# Patient Record
Sex: FEMALE | Race: OTHER | HISPANIC OR LATINO | URBAN - METROPOLITAN AREA
[De-identification: names, ages, dates, MRNs, and addresses within clinical notes are randomized per-mention and may not be internally consistent; named-entity substitution may affect disease eponyms.]

---

## 2021-01-20 ENCOUNTER — EMERGENCY (EMERGENCY)
Facility: HOSPITAL | Age: 21
LOS: 1 days | Discharge: HOME | End: 2021-01-20
Attending: STUDENT IN AN ORGANIZED HEALTH CARE EDUCATION/TRAINING PROGRAM
Payer: SELF-PAY

## 2021-01-20 VITALS
HEART RATE: 99 BPM | RESPIRATION RATE: 18 BRPM | TEMPERATURE: 99 F | DIASTOLIC BLOOD PRESSURE: 68 MMHG | OXYGEN SATURATION: 98 % | WEIGHT: 138.01 LBS | SYSTOLIC BLOOD PRESSURE: 129 MMHG

## 2021-01-20 PROCEDURE — 70450 CT HEAD/BRAIN W/O DYE: CPT | Mod: 26

## 2021-01-20 PROCEDURE — 99284 EMERGENCY DEPT VISIT MOD MDM: CPT

## 2021-01-20 PROCEDURE — 70450 CT HEAD/BRAIN W/O DYE: CPT

## 2021-01-20 PROCEDURE — 99284 EMERGENCY DEPT VISIT MOD MDM: CPT | Mod: 25

## 2021-01-20 RX ORDER — IBUPROFEN 200 MG
600 TABLET ORAL ONCE
Refills: 0 | Status: COMPLETED | OUTPATIENT
Start: 2021-01-20 | End: 2021-01-20

## 2021-01-20 RX ORDER — METHOCARBAMOL 500 MG/1
750 TABLET, FILM COATED ORAL ONCE
Refills: 0 | Status: COMPLETED | OUTPATIENT
Start: 2021-01-20 | End: 2021-01-20

## 2021-01-20 RX ADMIN — Medication 600 MILLIGRAM(S): at 12:30

## 2021-01-20 RX ADMIN — METHOCARBAMOL 750 MILLIGRAM(S): 500 TABLET, FILM COATED ORAL at 12:30
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
## 2021-01-20 NOTE — ED ADULT NURSE NOTE - MODE OF DISCHARGE
Current Discharge Medication List  
  
ASK your doctor about these medications Dose & Instructions Dispensing Information Comments Morning Noon Evening Bedtime  
 baclofen 10 mg tablet Commonly known as:  LIORESAL Your next dose is: Today, Tomorrow Other:  ____________ Dose:  10 mg Take 10 mg by mouth as needed. Refills:  0 GENGRAF 25 mg capsule Generic drug:  cycloSPORINE modified Your next dose is: Today, Tomorrow Other:  ____________ Dose:  2.5 mg/kg/day Take 2.5 mg/kg/day by mouth every morning. Indications: Takes in the AM  
 Refills:  0  
     
   
   
   
  
 lactulose 10 gram/15 mL solution Commonly known as:  Eual Alice Your next dose is: Today, Tomorrow Other:  ____________ Dose:  30 g Take 45 mL by mouth three (3) times daily. Quantity:  480 mL Refills:  0  
     
   
   
   
  
 LANTUS SOLOSTAR 100 unit/mL (3 mL) pen Generic drug:  insulin glargine Your next dose is: Today, Tomorrow Other:  ____________ Dose:  25 Units 25 Units by SubCUTAneous route nightly. Refills:  0  
     
   
   
   
  
 magnesium oxide 400 mg tablet Commonly known as:  MAG-OX Your next dose is: Today, Tomorrow Other:  ____________ Dose:  400 mg Take 400 mg by mouth two (2) times a day. Refills:  0  
     
   
   
   
  
 midodrine 5 mg tablet Commonly known as:  Maddock Mass Your next dose is: Today, Tomorrow Other:  ____________ Dose:  5 mg Take 5 mg by mouth every eight (8) hours. 2 q8h Refills:  0 NovoLOG 100 unit/mL injection Generic drug:  insulin aspart Your next dose is: Today, Tomorrow Other:  ____________  
   
   
 by SubCUTAneous route. Sliding scale Refills:  0  
     
   
   
   
  
 ondansetron hcl 4 mg tablet Commonly known as:  Chidi Gonzalez  
   
 Your next dose is: Today, Tomorrow Other:  ____________ Dose:  4 mg Take 1 Tab by mouth every eight (8) hours as needed for Nausea. Quantity:  30 Tab Refills:  0 PROTONIX 40 mg tablet Generic drug:  pantoprazole Your next dose is: Today, Tomorrow Other:  ____________ Dose:  40 mg Take 40 mg by mouth four (4) times daily. 2tabs bid Refills:  0  
     
   
   
   
  
 traMADol 50 mg tablet Commonly known as:  ULTRAM  
   
Your next dose is: Today, Tomorrow Other:  ____________ Dose:  50 mg Take 1 Tab by mouth every six (6) hours as needed. Max Daily Amount: 200 mg. Indications: PAIN Quantity:  20 Tab Refills:  0  
     
   
   
   
  
 XIFAXAN 550 mg tablet Generic drug:  rifAXIMin Your next dose is: Today, Tomorrow Other:  ____________ Dose:  550 mg Take 550 mg by mouth three (3) times daily. Refills:  0  
     
   
   
   
  
 zinc sulfate 220 (50) mg capsule Commonly known as:  ZINCATE Your next dose is: Today, Tomorrow Other:  ____________ Dose:  220 mg Take 220 mg by mouth every morning. Refills:  5 Ambulatory
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
## 2021-01-20 NOTE — ED PROVIDER NOTE - PROGRESS NOTE DETAILS
CT head negative. Will need to follow up with PMD in 2-3 days. Pt is well appearing walking with steady gait, stable for discharge and follow up without fail with medical doctor. I had a detailed discussion with the patient and/or guardian regarding the historical points, exam findings, and any diagnostic results supporting the discharge diagnosis. Pt educated on care and need for follow up. Strict return instructions and red flag signs and symptoms discussed with patient. Questions answered. Pt shows understanding of discharge information and agrees to follow.
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
## 2021-01-20 NOTE — ED PROVIDER NOTE - NSFOLLOWUPINSTRUCTIONS_ED_ALL_ED_FT
Follow up with the primary care doctor in 2-3 days.  If you experience any new or worsening symptoms or if you are concerned you can always come back to the emergency for a re-evaluation.  For pain you can take over the counter Ibuprofen 600 mg orally every 6 hours as needed for pain. Take medication with food.
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
## 2021-01-20 NOTE — ED PROVIDER NOTE - CHPI ED SYMPTOMS NEG
no neck pain, back pain, numbness, tingling, focal weakness, vision changes, chest pain, shortness of breath

## 2021-01-20 NOTE — ED PROVIDER NOTE - PATIENT PORTAL LINK FT
You can access the FollowMyHealth Patient Portal offered by [facility] by registering at the following website: [email or web address]. By joining [employer]’s FollowMyHealth portal, you will also be able to view your health information using other applications (apps) compatible with our system.

## 2023-07-14 NOTE — ED ADULT NURSE NOTE - ED CARDIAC RATE
Patient pleasant since arriving from ER. Patient is resting in bed with family at bedside. Patient A&Ox4. Patient is currently on room air. Vital signs stable. No complaints or concerns at this time.     
normal